# Patient Record
Sex: FEMALE | Race: WHITE | Employment: UNEMPLOYED | URBAN - METROPOLITAN AREA
[De-identification: names, ages, dates, MRNs, and addresses within clinical notes are randomized per-mention and may not be internally consistent; named-entity substitution may affect disease eponyms.]

---
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
## 2025-01-20 ENCOUNTER — TELEPHONE (OUTPATIENT)
Dept: INTERNAL MEDICINE CLINIC | Facility: CLINIC | Age: 20
End: 2025-01-20

## 2025-01-20 DIAGNOSIS — R51.9 FREQUENT HEADACHES: Primary | ICD-10-CM

## 2025-01-20 RX ORDER — BUTALBITAL AND ACETAMINOPHEN 50; 300 MG/1; MG/1
1-2 CAPSULE ORAL 3 TIMES DAILY PRN
Qty: 30 CAPSULE | Refills: 3 | Status: SHIPPED | OUTPATIENT
Start: 2025-01-20

## 2025-01-20 NOTE — TELEPHONE ENCOUNTER
Patient's mother called     Pharmacy does not have medication in stock to purchase with Good Rx coupon  No other pharmacies in a close radius has is it either    Can an alternate medication be prescribed

## 2025-01-20 NOTE — TELEPHONE ENCOUNTER
Patient is calling and states that she is having bad migraines again.  They started about 2 days ago.  Patient states the pain is so bad she can barely move.  Patient is away at college and is asking if her migraine medication sent to the [facility] in [facility]

## (undated) NOTE — Clinical Note
[facility] [employer] of Child Health Examination       Student's Name  [address] [place] [address] Title                           Date    (If adding dates to the above immunization history section, put your initials by date(s) and sign here.)   ALTERNATIVE PROOF OF IMMUNITY   1 Review of patient's allergies indicates no known allergies. MEDICATION  (List all prescribed or taken on a regular basis.)     Diagnosis of asthma?   Child wakes during the night coughing  Yes       No   Yes       No    Loss of function of one of paired org DIABETES SCREENING  BMI>85% age/sex  No And any two of the following:  Family History    No                  Ethnic Minority         No                     Signs of Insulin Resistance (hypertension, dyslipidemia, polycystic ovarian syndrome, acanthosis nig Quick-relief  medication (e.g. Short Acting Beta Antagonist): No          Controller medication (e.g. inhaled corticosteroid):   No Other   NEEDS/MODIFICATIONS required in the school setting  None DIETARY Needs/Restrictions     None   SPECIAL INSTR

## (undated) NOTE — LETTER
VACCINE ADMINISTRATION RECORD  PARENT / GUARDIAN APPROVAL  Date: 2022  Vaccine administered to: José Huang     : 3/28/2005    MRN: [id number]    A copy of the appropriate Centers for Disease Control and Prevention Vaccine Information statement has been provided. I have read or have had explained the information about the diseases and the vaccines listed below. There was an opportunity to ask questions and any questions were answered satisfactorily. I believe that I understand the benefits and risks of the vaccine cited and ask that the vaccine(s) listed below be given to me or to the person named above (for whom I am authorized to make this request). VACCINES ADMINISTERED:  Menveo    I have read and hereby agree to be bound by the terms of this agreement as stated above. My signature is valid until revoked by me in writing. This document is signed by , relationship: Mother on 2022.:                                                                                                                                         Parent / Estefani Lawn                                                Date    Donna Patel served as a witness to authentication that the identity of the person signing electronically is in fact the person represented as signing. This document was generated by Donna Patel on 2022.

## (undated) NOTE — LETTER
12/22/2018              Charu Bueno        [address] [facility] [address]       SCHOOL MEDICATION PERMISSION FORM    SCHOOL DISTRICT:      TO BE COMPLETED IN DETAIL BY THE PARENT/GUARDIAN:    STUDENT'S NAME:  Mesfin Proctor

## (undated) NOTE — LETTER
[facility] [employer] Child Health Examination       Student's Name  Kimberlyn Campos Signature                                                                                                                                   Title                           Date  6/14/2019   Signature 3/28/2005  Sex  Female School   Grade Level/ID#  9th Grade   HEALTH HISTORY          TO BE COMPLETED AND SIGNED BY PARENT/GUARDIAN AND VERIFIED BY HEALTH CARE PROVIDER    ALLERGIES  (Food, drug, insect, other)  Patient has no known allergies.  MEDICATION  ( Date     PHYSICAL EXAMINATION REQUIREMENTS    Entire section below to be completed by MD//APN/PA       PHYSICAL EXAMINATION REQUIREMENTS (head circumference if <33 years old):   /64   Pulse 56   Ht 5' 6\" ( Nose Yes  Neurological Yes    Throat Yes  Musculoskeletal Yes    Mouth/Dental Yes  Spinal examination Yes    Cardiovascular/HTN Yes  Nutritional status Yes    Respiratory Yes                   Diagnosis of Asthma: No Mental Health Yes        Currently Pres

## (undated) NOTE — LETTER
Name:  Chela [place] School Year:  11th Grade Class: Student ID No.:   Address:  [address] [place] [address] [address] [address] Phone:  [phone] (home) [phone] (work) : [age]year old   Name Relationship [address] Work CPM Braxis problem, pacemaker, or implanted defibrillator? 12. Has anyone in your family had unexplained fainting, seizures, or near drowning?      BONE AND JOINT QUESTIONS Yes No   17. Have you ever had an injury to a bone, muscle, ligament, or tendon that caused been unable to move your arms / legs after being hit /fall? 40. Have you ever become ill while exercising in the heat?     41. Do you get frequent muscle cramps when exercising? 42.  Do you or someone in your family have sickle cell trait or disease impulse (PMI) Yes    Pulses Yes    Lungs Yes    Abdomen Yes    Genitourinary (males only)* N/A    Skin:  HSV, lesions suggestive of MRSA, tinea corporis Yes    Neurologic* Yes    MUSCULOSKELETAL     Neck Yes    Back Yes    Shoulder/arm Yes    Elbow/forearm substances in my/his/her body either during [employer] state series events or during the school day, and I/our student do/does hereby agree to submit to such testing and analysis by a certified laboratory.  We further understand and agree that the results of the

## (undated) NOTE — LETTER
7/25/2019              Belia Rodriguez        [address] [id number]         Raji Stokes does not have asthma and is currently on no prescription medications. Thank you. Sincerely,    Mandie Caldera.  DO Rios  [facility]

## (undated) NOTE — LETTER
[facility] [employer] Child Health Examination       Student's Name  [address] [place] [address] Signature                                                                                                                                   Title                           Date   6/18/2018    Signature 3/28/2005  Sex  Female School   Grade Level/ID#  8th Grade   HEALTH HISTORY          TO BE COMPLETED AND SIGNED BY PARENT/GUARDIAN AND VERIFIED BY HEALTH CARE PROVIDER    ALLERGIES  (Food, drug, insect, other)  Patient has no known allergies.  MEDICATION  ( Date     PHYSICAL EXAMINATION REQUIREMENTS    Entire section below to be completed by MD//APN/PA       PHYSICAL EXAMINATION REQUIREMENTS (head circumference if <33 years old):   /75   Pulse 76   Ht 5' 4\" ( Nose Yes  Neurological Yes    Throat Yes  Musculoskeletal Yes    Mouth/Dental Yes  Spinal examination Yes    Cardiovascular/HTN Yes  Nutritional status Yes    Respiratory Yes                   Diagnosis of Asthma: No Mental Health Yes        Currently Pres

## (undated) NOTE — LETTER
VACCINE ADMINISTRATION RECORD  PARENT / GUARDIAN APPROVAL  Date: 2021  Vaccine administered to: Nellie Foss     : 3/28/2005    MRN: [id number]    A copy of the appropriate Centers for Disease Control and Prevention Vaccine Information state

## (undated) NOTE — MR AVS SNAPSHOT
[facility] [address] [address] [address] [facility]  [phone]               Thank you for choosing us for your health care visit with Napoleon Neves MD.  We are glad to serve you and happy to provide yo Return if symptoms worsen or fail to improve. [employer]     Sign up for [employer]ura[employer] access for your child. Dura[employer] access allows you to view health information for your child from their recent   visit, view other health information and more.   To sign u o grow a family garden    In addition to 11, 4, 3, 2, 1 families can make small changes in their family routines to help everyone lead healthier active lives.  Try:  o Eating breakfast everyday  o Eating low-fat dairy products like yogurt, milk, and cheese

## (undated) NOTE — MR AVS SNAPSHOT
[address] [address] [address] [facility]  [phone]               Thank you for choosing us for your health care visit with Ta Birmingham. DO Rios.   We are glad to serve you and happy to provide you · Life at home. How is your child’s behavior? Does he or she get along with others in the family? Is he or she respectful of you, other adults, and authority?  Does your child participate in family events, or does he or she withdraw from other family member changes, becoming lower and deeper. As the penis grows and matures, erections and “wet dreams” begin to occur. Reassure your son that this is normal.  · Emotional changes.  Along with these physical changes, you’ll likely notice changes in your child’s pers family time. It also lets you see what and how your child eats. · Pay attention to portions. Serve portions that make sense for your kids. Let them stop eating when they’re full—don’t make them clean their plates.  Be aware that many kids’ appetites increa to wear wrist guards, elbow pads, and knee pads. · In the car, all children younger than 13 should sit in the back seat. Children shorter than 4'9\" (57 inches) should continue to use a booster seat to properly position the seat belt.   · If your child has · Set limits for the use of cell phones, the computer, and the Internet. Remind your child that you can check the web browser history and cell phone logs to know how these devices are being used.  Use parental controls and passwords to block access to [employer] Please dose every 4 hours as needed,do not give more than 5 doses in any 24 hour period  Dosing should be done on a dose/weight basis  Children's Oral Suspension= 160 mg in each tsp  Childrens Chewable =80 mg  Jr Strength Chewables= 160 mg  Regular Strengt Infant concentrated      Childrens               Chewables        Adult tablets                                    Drops                      Suspension                12-17 lbs                1.25 ml  18-23 lbs girls: changes in fat distribution, pubic hair, breast development; start of menstrual period   boys: testicular growth, voice changes, pubic hair  Emotional Development   May be lorenzana. Struggles with sense of identity.    Is sensitive and has a need for [employer]     Sign up for [employer] access for your child. [employer] access allows you to view health information for your child from their recent   visit, view other health information and more.   To sign up or find more information on getting   Pro

## (undated) NOTE — LETTER
[facility] [employer] Child Health Examination       Student's Name  Nate Blackwood below.  Signature                                   ***                                                                                                Title          DO                 Date  8/25/2021   Signature 3/28/2005  Sex  Female School   Grade Level/ID#  11th Grade   HEALTH HISTORY          TO BE COMPLETED AND SIGNED BY PARENT/GUARDIAN AND VERIFIED BY HEALTH CARE PROVIDER    ALLERGIES  (Food, drug, insect, other)  Patient has no known allergies.  MEDICATION REQUIREMENTS (head circumference if <33 years old): There were no vitals taken for this visit.     DIABETES SCREENING  BMI>85% age/sex  No And any two of the following:  Family History No    Ethnic Minority  No          Signs of Insulin Resistance (hyper Currently Prescribed Asthma Medication:            Quick-relief  medication (e.g. Short Acting Beta Antagonist): No          Controller medication (e.g. inhaled corticosteroid):   No Other   NEEDS/MODIFICATIONS required in the school setting  None D

## (undated) NOTE — Clinical Note
2/7/2017              Aicha El [address] [address]         To Whom It May Concern,    Please excuse Daryn Osorio from school 2/7/17 and 2/8/17 due to illness.     Sincerely,    Nancey Gilford, MD